# Patient Record
Sex: FEMALE | Race: WHITE | NOT HISPANIC OR LATINO
[De-identification: names, ages, dates, MRNs, and addresses within clinical notes are randomized per-mention and may not be internally consistent; named-entity substitution may affect disease eponyms.]

---

## 2020-11-30 ENCOUNTER — TRANSCRIPTION ENCOUNTER (OUTPATIENT)
Age: 45
End: 2020-11-30

## 2021-12-27 PROBLEM — Z00.00 ENCOUNTER FOR PREVENTIVE HEALTH EXAMINATION: Status: ACTIVE | Noted: 2021-12-27

## 2022-01-03 ENCOUNTER — APPOINTMENT (OUTPATIENT)
Dept: FAMILY MEDICINE | Facility: CLINIC | Age: 47
End: 2022-01-03

## 2022-05-20 ENCOUNTER — APPOINTMENT (OUTPATIENT)
Dept: ORTHOPEDIC SURGERY | Facility: CLINIC | Age: 47
End: 2022-05-20

## 2022-05-26 PROBLEM — Z00.00 ENCOUNTER FOR PREVENTIVE HEALTH EXAMINATION: Noted: 2022-05-26

## 2022-05-31 RX ORDER — TRAMADOL HYDROCHLORIDE 50 MG/1
50 TABLET, COATED ORAL TWICE DAILY
Qty: 60 | Refills: 0 | Status: ACTIVE | COMMUNITY
Start: 2022-05-31 | End: 1900-01-01

## 2022-06-03 ENCOUNTER — APPOINTMENT (OUTPATIENT)
Dept: ORTHOPEDIC SURGERY | Facility: CLINIC | Age: 47
End: 2022-06-03

## 2022-06-10 ENCOUNTER — APPOINTMENT (OUTPATIENT)
Dept: ORTHOPEDIC SURGERY | Facility: CLINIC | Age: 47
End: 2022-06-10
Payer: COMMERCIAL

## 2022-06-10 VITALS — WEIGHT: 214 LBS | BODY MASS INDEX: 33.2 KG/M2 | HEIGHT: 67.5 IN

## 2022-06-10 DIAGNOSIS — Z78.9 OTHER SPECIFIED HEALTH STATUS: ICD-10-CM

## 2022-06-10 DIAGNOSIS — M46.00 SPINAL ENTHESOPATHY, SITE UNSPECIFIED: ICD-10-CM

## 2022-06-10 PROCEDURE — 99213 OFFICE O/P EST LOW 20 MIN: CPT | Mod: 25

## 2022-06-10 PROCEDURE — 20552 NJX 1/MLT TRIGGER POINT 1/2: CPT

## 2022-06-10 PROCEDURE — 72040 X-RAY EXAM NECK SPINE 2-3 VW: CPT

## 2022-06-13 ENCOUNTER — FORM ENCOUNTER (OUTPATIENT)
Age: 47
End: 2022-06-13

## 2022-06-14 ENCOUNTER — APPOINTMENT (OUTPATIENT)
Dept: MRI IMAGING | Facility: CLINIC | Age: 47
End: 2022-06-14
Payer: COMMERCIAL

## 2022-06-14 ENCOUNTER — APPOINTMENT (OUTPATIENT)
Dept: MRI IMAGING | Facility: CLINIC | Age: 47
End: 2022-06-14

## 2022-06-14 PROBLEM — M46.00 SPINAL ENTHESOPATHY: Status: ACTIVE | Noted: 2022-06-14

## 2022-06-14 PROCEDURE — 72156 MRI NECK SPINE W/O & W/DYE: CPT

## 2022-06-14 NOTE — PHYSICAL EXAM
[Normal Coordination] : normal coordination [Normal DTR UE/LE] : normal DTR UE/LE  [Normal Sensation] : normal sensation [Normal Mood and Affect] : normal mood and affect [Orientated] : orientated [Able to Communicate] : able to communicate [Normal Skin] : normal skin [No Rash] : no rash [No Ulcers] : no ulcers [No Lesions] : no lesions [No obvious lymphadenopathy in areas examined] : no obvious lymphadenopathy in areas examined [Well Developed] : well developed [Well Nourished] : well nourished [Peripheral vascular exam is grossly normal] : peripheral vascular exam is grossly normal [No Respiratory Distress] : no respiratory distress [Lungs clear to auscultation bilaterally] : lungs clear to auscultation bilaterally [NL (45)] : right lateral flexion 45 degrees [NL (80)] : right lateral rotation 80 degrees [5___] : right grasp 5[unfilled]/5 [Biceps 2+] : biceps 2+ [Triceps 2+] : triceps 2+ [Brachioradialis 2+] : brachioradialis 2+ [] : no rhomboid tenderness [FreeTextEntry1] : Good maintenance of alignment and implant placement. Good progress towards fusion.

## 2022-06-14 NOTE — DISCUSSION/SUMMARY
[Medication Risks Reviewed] : Medication risks reviewed [de-identified] : Discussed proper body mechanics and modified physical activity to avoid aggravation of symptoms. Patient will continue with at home exercises/stretches as best tolerated. Patient referred for updated cervical spine MRI scan for new and worsening radiating pain into RT arm. Evaluate for new disc herniation vs stenosis given recurrence of right shoulder and proximal arm pain now 5 months post ACDF and refractory to home exercise program and medical management.. Discussed further treatment with injection therapy. Trigger point injection given office. Patient will follow up after MRI scan. Reviewed and discussed results of cervical spine x-ray.\par \par Today in office as part of ongoing treatment a trigger point injection is administered into the right trapezius and paracervical muscles to facilitate ROM and stretching. Injection consisted of 1ccc Kenalog 40 and 4cc .25% Marcaine.

## 2022-06-14 NOTE — HISTORY OF PRESENT ILLNESS
[10] : 10 [Tingling] : tingling [Full time] : Work status: full time [de-identified] : Patient states the severity of her pain has gotten worse since her previous visit. Patient also complains of dizziness. Pain radiates into RT arm with numbness/tingling sensation. Treatment with naproxen and Tramadol as prescribed has provided temporary relief. Pain is worse with increase physical activity. No known trauma. Patient has continued with at home exercises/stretches as best tolerated. [] : no [FreeTextEntry1] : c-spine  [FreeTextEntry6] : numbness  [FreeTextEntry7] : right arm  [de-identified] : None

## 2022-06-14 NOTE — DATA REVIEWED
[Cervical Spine] : cervical spine [I independently reviewed and interpreted images and report] : I independently reviewed and interpreted images and report [I reviewed the films/CD and additionally noted] : I reviewed the films/CD and additionally noted [FreeTextEntry1] : I stop paperwork reviewed

## 2022-06-18 ENCOUNTER — APPOINTMENT (OUTPATIENT)
Dept: ORTHOPEDIC SURGERY | Facility: CLINIC | Age: 47
End: 2022-06-18
Payer: COMMERCIAL

## 2022-06-18 VITALS — HEIGHT: 67.5 IN | BODY MASS INDEX: 32.89 KG/M2 | WEIGHT: 212 LBS

## 2022-06-18 PROCEDURE — 99214 OFFICE O/P EST MOD 30 MIN: CPT

## 2022-06-19 NOTE — PHYSICAL EXAM
[Flexion] : flexion [Extension] : extension [Rotation to left] : rotation to left [] : light touch intact throughout both upper extremities [de-identified] : Constitutional:\par -  General Appearance: \par Unremarkable\par Body Habitus\par Well Developed \par Well Nourished\par Body Habitus\par No Deformities\par Well Groomed\par \par Ability To communicate:\par Normal\par \par Neurologic: \par Global sensation is intact to upper and lower extremities.  See examination of Neck and/or Spine for exceptions.\par Orientation to Time, Place and Person is: Normal\par Mood And Affect is Normal\par \par Skin:\par -  Head/Face, Right Upper/Lower Extremity, Left Upper/Lower Extremity: Normal\par See Examination of Neck and/or Spine for exceptions\par \par Cardiovascular:\par Peripheral Cardiovascular System is Normal\par \par Palpation of Lymph Nodes:\par Normal Palpation of lymph nodes in: Axilla, Cervical, Inguinal\par Abnormal Palpation of lymph nodes in: None

## 2022-06-19 NOTE — DATA REVIEWED
[MRI] : MRI [Cervical Spine] : cervical spine [I independently reviewed and interpreted images and report] : I independently reviewed and interpreted images and report [FreeTextEntry1] : I stop paperwork reviewed

## 2022-06-19 NOTE — REVIEW OF SYSTEMS
[Arthralgia] : arthralgia [Joint Pain] : joint pain [Joint Stiffness] : joint stiffness [Negative] : Heme/Lymph [FreeTextEntry9] : cervical spine

## 2022-06-19 NOTE — ASSESSMENT
[FreeTextEntry1] : 46 y/o female with cervical stenosis. I am referring the patient to pain management to discuss trying further injection based therapies, cervical epidural injection. I am referring the patient to physiatry to discuss other types of non-invasive treatments, acupuncture. I would like to follow up with the patient in 3-4 weeks to assess his response to conservative care. Discussed possibility of acdf C6-7 if positive response to epidural.\par \par Prior to appointment and during encounter with patient extensive medical records were reviewed including but not limited to, hospital records, out patient records, imaging results, and lab data. During this appointment the patient was examined, diagnoses were discussed and explained in a face to face manner. In addition extensive time was spent reviewing aforementioned diagnostic studies. Counseling including abnormal image results, differential diagnoses, treatment options, risk and benefits, lifestyle changes, current condition, and current medications was performed. Patient's comments, questions, and concerns were address and patient verbalized understanding. Based on this patient's presentation at our office, which is an orthopedic spine surgeon's office, this patient inherently / intrinsically has a risk, however minute, of developing issues such as Cauda equina syndrome, bowel and bladder changes, or progression of motor or neurological deficits such as paralysis which may be permanent.\par \par The documentation recorded by the scribe, in my presence, accurately reflects the service that I personally performed and the decisions made by me with my edits as appropriate.

## 2022-06-19 NOTE — HISTORY OF PRESENT ILLNESS
[10] : 10 [Radiating] : radiating [] : yes [Constant] : constant [Nothing helps with pain getting better] : Nothing helps with pain getting better [de-identified] : 46 y/o female presents for a follow up evaluation and review of her cervical spine MRI. Patient c/o continued neck and right upper extremity pain. Pain predominantly in right trapezius, shoulder and periscapular area. No improvement with nsaid, muscle relaxer or trigger point injection. [FreeTextEntry7] : right side [de-identified] : MRI [facility]

## 2022-06-27 ENCOUNTER — APPOINTMENT (OUTPATIENT)
Dept: PAIN MANAGEMENT | Facility: CLINIC | Age: 47
End: 2022-06-27
Payer: COMMERCIAL

## 2022-06-27 VITALS — WEIGHT: 213 LBS | HEIGHT: 67 IN | BODY MASS INDEX: 33.43 KG/M2

## 2022-06-27 PROCEDURE — 99204 OFFICE O/P NEW MOD 45 MIN: CPT

## 2022-06-27 NOTE — HISTORY OF PRESENT ILLNESS
[Neck] : neck [Right Arm] : right arm [Sudden] : sudden [10] : 10 [9] : 9 [Localized] : localized [Tingling] : tingling [Intermittent] : intermittent [Household chores] : household chores [Leisure] : leisure [Social interactions] : social interactions [Meds] : meds [Exercising] : exercising [Full time] : Work status: full time [FreeTextEntry1] : The patient presents for initial evaluation regarding their neck pain. Patient was referred by Dr. Higgins. Patient is 6 months s/p C4-5 ACDF with Dr. Higgins. She reports some improvement of pain following the sx. Patient reports this pain is different from the prior. Patient c/o neck pain radiating to the right shoulder with associated paraesthesia to the right digits. She reports pain has become more persistent. 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 Patient reports some benefit with Naproxen. Patient reports having IM cortisone injections with no observable benefit. \par \par Subjective Weakness: Yes\par Numbness/Tingling: Yes\par Bladder/Bowel dysfunction: No \par Gait Abnormalities: No \par Fine motor coordination changes: No \par \par Injections: No  \par \par Pertinent Surgical History: \par 1) C4-5 ACDF (01/10/22) - Dr. Higgins\par \par Imaging: \par MRI Cervical Spine (06/14/22) - [facility]\par \par C2-C3: There is no posterior disc herniation.\par C3-C4: There is mild disc bulging, bony ridging and right greater than left foraminal narrowing without cord or \par exiting nerve root impingement.\par C4-C5: There are new postoperative changes with residual bony ridging contributing to mild central stenosis on \par the left and there is left greater than right foraminal narrowing improved since prior exam.\par C5-C6: There is disc bulging, bony ridging, uncovertebral hypertrophy and right greater than left foraminal \par narrowing.\par C6-C7: There is disc bulging, bony ridging, uncovertebral hypertrophy and right greater than left foraminal \par narrowing with right exiting nerve root impingement with right foraminal herniation. \par \par Physician Disclaimer: I have personally reviewed and confirmed all HPI data with the patient. [] : no [FreeTextEntry7] : right arm and right fingers [de-identified] : cervical mri at [facility] and [facility]

## 2022-06-27 NOTE — ASSESSMENT
[FreeTextEntry1] : A discussion regarding available pain management treatment options occurred with the patient.  These included interventional, rehabilitative, pharmacological, and alternative modalities. We will proceed with the following: \par \par Interventional treatment options: \par - Proceed with right PM C7-T1 ALVARO with fluoroscopic guidance \par - see additional instructions below \par \par Rehabilitative options: \par - completed prior PT \par - continue HEP as tolerated \par \par Medication based treatment options: \par - continue Lyrica 75 mg BID; further titration based upon clinical response\par - continue naproxen 500mg BID PRN \par - see additional instructions below \par \par Complementary treatment options: \par - Weight management and lifestyle modifications discussed \par \par Additional treatment recommendations as follows: \par - patient with follow up with Dr. Higgins as directed \par - Follow up 1-2 weeks post injection for assessment of efficacy and further recommendations.\par \par We have discussed the risks, benefits, and alternatives NSAID therapy including but not limited to the risk of bleeding, thrombosis, gastric mucosal irritation/ulceration, allergic reaction and kidney dysfunction; the patient verbalizes an understanding.\par \par The risks, benefits and alternatives of the proposed procedure were explained in detail with the patient. The risks outlined include but are not limited to infection, bleeding, post- dural puncture headache, nerve injury, a temporary increase in pain, failure to resolve symptoms, allergic reaction, and possible elevation of blood sugar in diabetics if using corticosteroid.  All questions were answered to patient's apparent satisfaction and he/she verbalized an understanding.\par \par The documentation recorded by the scribe, in my presence, accurately reflects the service I personally performed and the decisions made by me with my edits as appropriate.\par \par I, Kumar Bray acting as scribe, attest that this documentation has been prepared under the direction and in the presence of Provider Srinivas Fontaine DO.

## 2022-06-27 NOTE — PHYSICAL EXAM
[Normal Coordination] : normal coordination [Normal Mood and Affect] : normal mood and affect [Orientated] : orientated [Able to Communicate] : able to communicate [Well Developed] : well developed [Well Nourished] : well nourished [5___] : right finger abductors 5[unfilled]/5 [4___] : right grasp 4[unfilled]/5 [] : negative Chase reflex

## 2022-07-08 ENCOUNTER — APPOINTMENT (OUTPATIENT)
Dept: PAIN MANAGEMENT | Facility: CLINIC | Age: 47
End: 2022-07-08

## 2022-07-08 PROCEDURE — 62321 NJX INTERLAMINAR CRV/THRC: CPT

## 2022-07-08 NOTE — PROCEDURE
[FreeTextEntry3] : Date of Service: 07/08/2022 \par \par Account: [id number]\par \par Patient: Agustin Bustamante \par \par YOB: 1975\par \par Age: 47 year\par \par Surgeon:      Srinivas Fontaine DO\par \par Assistant:    None\par \par Pre-Operative Diagnosis:         Cervical Radiculopathy (M54.12)\par \par Post Operative Diagnosis:       Cervical Radiculopathy (M54.12)\par \par Procedure:             Right paramedian (C7-T1) interlaminar epidural steroid injection under fluoroscopic guidance\par \par Anesthesia:  MAC\par \par This procedure was carried out using fluoroscopic guidance.  The risks and benefits of the procedure were discussed extensively with the patient.  The consent of the patient was obtained and the following procedure was performed.   A timeout was performed with all essential staff present and the site and side were verified.\par \par The patient was placed in the prone position and optimized to patient comfort.  The cervical area was prepped and draped in a sterile fashion.  The fluoroscope visualized the C7-T1 interspace using slight cephalad-caudad angulation and this area was marked.  Using sterile technique the superficial skin was anesthetized with 1% Lidocaine.  A 20 gauge 3.5 inch Tuohy needle was advanced under fluoroscopy until ligament was engaged.  Using a contralateral oblique view, a "loss of resistance" to air technique was utilized in order to gain access to the epidural space.  After negative aspiration for heme and CSF, 1 cc of Omnipaque contrast was administered and the appropriate cervical epidurogram was obtained in the [place] and A/P view as well as digital subtraction angiography.\par \par A total injectate of 3 cc of preservative free normal saline and 10 mg of Dexamethasone was then injected into the epidural space while maintaining meaningful verbal contact with the patient.  \par \par The needle was subsequently removed.  Vital signs remained normal.  Pulse oximeter was used throughout the procedure and the patient's pulse and oxygen saturation remained within normal limits.  The patient tolerated the procedure well.  There were no complications.  The patient was instructed to apply ice over the injection sites for twenty minutes every two hours for the next 24 to 48 hours.\par \par Disposition:\par      1. The patient was advised to F/U in 1-2 weeks to assess the response to the injection.\par      2. The patient was also instructed to contact me immediately if there were any concerns related to the procedure performed.

## 2022-07-12 ENCOUNTER — APPOINTMENT (OUTPATIENT)
Dept: PAIN MANAGEMENT | Facility: CLINIC | Age: 47
End: 2022-07-12

## 2022-07-25 ENCOUNTER — APPOINTMENT (OUTPATIENT)
Dept: PAIN MANAGEMENT | Facility: CLINIC | Age: 47
End: 2022-07-25

## 2022-07-25 VITALS — WEIGHT: 213 LBS | HEIGHT: 67 IN | BODY MASS INDEX: 33.43 KG/M2

## 2022-07-25 DIAGNOSIS — Z98.1 ARTHRODESIS STATUS: ICD-10-CM

## 2022-07-25 PROCEDURE — 99214 OFFICE O/P EST MOD 30 MIN: CPT

## 2022-07-25 RX ORDER — NAPROXEN 500 MG/1
500 TABLET ORAL TWICE DAILY
Qty: 60 | Refills: 2 | Status: ACTIVE | COMMUNITY
Start: 2022-07-25 | End: 1900-01-01

## 2022-07-25 NOTE — ASSESSMENT
[FreeTextEntry1] : A discussion regarding available pain management treatment options occurred with the patient.  These included interventional, rehabilitative, pharmacological, and alternative modalities. We will proceed with the following: \par \par Interventional treatment options: \par - Proceed with repeat right PM C7-T1 ALVARO with fluoroscopic guidance (80mg Kenalog) \par - counseled if no further benefit with repeat ALVARO will not recommend further\par - can consider cervical TPI for ongoing myofascial pain \par - see additional instructions below \par \par Rehabilitative options: \par - completed prior PT \par - continue HEP as tolerated \par \par Medication based treatment options: \par - patient self d/c Lyrica; defers further consideration \par - continue naproxen 500 mg BID PRN \par - continue OTC topical analgesics PRN \par - see additional instructions below \par \par Complementary treatment options: \par - Weight management and lifestyle modifications discussed \par \par Additional treatment recommendations as follows: \par - patient with follow up with Dr. Higgins as directed \par - Follow up 1-2 weeks post injection for assessment of efficacy and further recommendations.\par \par We have discussed the risks, benefits, and alternatives NSAID therapy including but not limited to the risk of bleeding, thrombosis, gastric mucosal irritation/ulceration, allergic reaction and kidney dysfunction; the patient verbalizes an understanding.\par \par The risks, benefits and alternatives of the proposed procedure were explained in detail with the patient. The risks outlined include but are not limited to infection, bleeding, post- dural puncture headache, nerve injury, a temporary increase in pain, failure to resolve symptoms, allergic reaction, and possible elevation of blood sugar in diabetics if using corticosteroid.  All questions were answered to patient's apparent satisfaction and he/she verbalized an understanding.\par \par The documentation recorded by the scribe, in my presence, accurately reflects the service I personally performed and the decisions made by me with my edits as appropriate.\par \par I, Kumar Bray acting as scribe, attest that this documentation has been prepared under the direction and in the presence of Provider Srinivas Fontaine DO.

## 2022-07-25 NOTE — HISTORY OF PRESENT ILLNESS
[Neck] : neck [Right Arm] : right arm [Sudden] : sudden [Localized] : localized [Tingling] : tingling [Intermittent] : intermittent [Household chores] : household chores [Leisure] : leisure [Social interactions] : social interactions [Meds] : meds [Exercising] : exercising [Full time] : Work status: full time [6] : 6 [4] : 4 [FreeTextEntry1] : 07/25/22 - Patient presents for a FUV following an right PM C7-T1 ALVARO on 07/08/22. Patient reports no short term relief following the injection. She reports stiffness and pain to the right trap and paraesthesia to the right second, third and fourth digit. Denies side effects to the injection. \par \par 06/27/22 - The patient presents for initial evaluation regarding their neck pain. Patient was referred by Dr. Higgins. Patient is 6 months s/p C4-5 ACDF with Dr. Higgins. She reports some improvement of pain following the sx. Patient reports this pain is different from the prior. Patient c/o neck pain radiating to the right shoulder with associated paraesthesia to the right digits. She reports pain has become more persistent. 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 Patient reports some benefit with Naproxen. Patient reports having IM cortisone injections with no observable benefit. \par \par Injections: \par 1) C7-T1 ALVARO (07/08/22) \par \par Pertinent Surgical History: \par 1) C4-5 ACDF (01/10/22) - Dr. Higgins\par \par Imaging: \par MRI Cervical Spine (06/14/22) - [facility]\par \par C2-C3: There is no posterior disc herniation.\par C3-C4: There is mild disc bulging, bony ridging and right greater than left foraminal narrowing without cord or \par exiting nerve root impingement.\par C4-C5: There are new postoperative changes with residual bony ridging contributing to mild central stenosis on \par the left and there is left greater than right foraminal narrowing improved since prior exam.\par C5-C6: There is disc bulging, bony ridging, uncovertebral hypertrophy and right greater than left foraminal \par narrowing.\par C6-C7: There is disc bulging, bony ridging, uncovertebral hypertrophy and right greater than left foraminal \par narrowing with right exiting nerve root impingement with right foraminal herniation. \par \par Physician Disclaimer: I have personally reviewed and confirmed all HPI data with the patient. [] : Post Surgical Visit: no [FreeTextEntry7] : right arm and right fingers [de-identified] : cervical mri at [facility] and [facility]

## 2022-07-30 ENCOUNTER — APPOINTMENT (OUTPATIENT)
Dept: PAIN MANAGEMENT | Facility: CLINIC | Age: 47
End: 2022-07-30

## 2022-08-01 ENCOUNTER — APPOINTMENT (OUTPATIENT)
Dept: ORTHOPEDIC SURGERY | Facility: CLINIC | Age: 47
End: 2022-08-01

## 2022-08-01 VITALS — WEIGHT: 213 LBS | HEIGHT: 67 IN | BODY MASS INDEX: 33.43 KG/M2

## 2022-08-01 PROCEDURE — 99214 OFFICE O/P EST MOD 30 MIN: CPT

## 2022-08-14 NOTE — PHYSICAL EXAM
[Flexion] : flexion [Extension] : extension [Rotation to left] : rotation to left [] : light touch intact throughout both upper extremities [de-identified] : Constitutional:\par -  General Appearance: \par Unremarkable\par Body Habitus\par Well Developed \par Well Nourished\par Body Habitus\par No Deformities\par Well Groomed\par \par Ability To communicate:\par Normal\par \par Neurologic: \par Global sensation is intact to upper and lower extremities.  See examination of Neck and/or Spine for exceptions.\par Orientation to Time, Place and Person is: Normal\par Mood And Affect is Normal\par \par Skin:\par -  Head/Face, Right Upper/Lower Extremity, Left Upper/Lower Extremity: Normal\par See Examination of Neck and/or Spine for exceptions\par \par Cardiovascular:\par Peripheral Cardiovascular System is Normal\par \par Palpation of Lymph Nodes:\par Normal Palpation of lymph nodes in: Axilla, Cervical, Inguinal\par Abnormal Palpation of lymph nodes in: None

## 2022-08-14 NOTE — HISTORY OF PRESENT ILLNESS
[10] : 10 [7] : 7 [Full time] : Work status: full time [de-identified] : 48 y/o female presents for a follow up evaluation of cervical. Patient c/o continued neck and right upper extremity pain. Pain predominantly in right trapezius, shoulder and periscapular area. No improvement with nsaid, muscle relaxer or trigger point injection. Patient reports no relief of symptoms from injection therapy  (at C5-6) with Dr. Fontaine. No relief from TPI. No positive response from injections.  Patient is RT handed. Finds her pain has affecting quality of life. Reports side effects from gabapentin, which she has stopped taking. Patient has had no success from tramadol or Lyrica.  [de-identified] : pain mgmt- injection

## 2022-08-14 NOTE — ASSESSMENT
[FreeTextEntry1] : 48 y/o female with cervical stenosis. Patient will continue treatment with Naproxen. Patient referred to Dr. Chapman for acupuncture. I advised the patient to receive second epidural injection with Dr. Fontaine. Discussed possibility of acdf C6-7 if positive response to epidural. Will follow up with patient in 4 weeks. \par \par Prior to appointment and during encounter with patient extensive medical records were reviewed including but not limited to, hospital records, out patient records, imaging results, and lab data. During this appointment the patient was examined, diagnoses were discussed and explained in a face to face manner. In addition extensive time was spent reviewing aforementioned diagnostic studies. Counseling including abnormal image results, differential diagnoses, treatment options, risk and benefits, lifestyle changes, current condition, and current medications was performed. Patient's comments, questions, and concerns were address and patient verbalized understanding. Based on this patient's presentation at our office, which is an orthopedic spine surgeon's office, this patient inherently / intrinsically has a risk, however minute, of developing issues such as Cauda equina syndrome, bowel and bladder changes, or progression of motor or neurological deficits such as paralysis which may be permanent.\par \par The documentation recorded by the scribe, in my presence, accurately reflects the service that I personally performed and the decisions made by me with my edits as appropriate.

## 2022-08-17 ENCOUNTER — APPOINTMENT (OUTPATIENT)
Dept: PHYSICAL MEDICINE AND REHAB | Facility: CLINIC | Age: 47
End: 2022-08-17

## 2022-08-17 DIAGNOSIS — Z86.39 PERSONAL HISTORY OF OTHER ENDOCRINE, NUTRITIONAL AND METABOLIC DISEASE: ICD-10-CM

## 2022-08-17 DIAGNOSIS — Z98.1 ARTHRODESIS STATUS: ICD-10-CM

## 2022-08-17 DIAGNOSIS — Z82.3 FAMILY HISTORY OF STROKE: ICD-10-CM

## 2022-08-17 PROCEDURE — 99205 OFFICE O/P NEW HI 60 MIN: CPT

## 2022-08-17 RX ORDER — LEVOTHYROXINE SODIUM 0.17 MG/1
TABLET ORAL
Refills: 0 | Status: ACTIVE | COMMUNITY

## 2022-08-17 RX ORDER — METAXALONE 800 MG/1
800 TABLET ORAL 3 TIMES DAILY
Qty: 90 | Refills: 0 | Status: ACTIVE | COMMUNITY
Start: 2022-08-17 | End: 1900-01-01

## 2022-08-17 RX ORDER — MELOXICAM 15 MG/1
15 TABLET ORAL DAILY
Qty: 30 | Refills: 0 | Status: ACTIVE | COMMUNITY
Start: 2022-08-17 | End: 1900-01-01

## 2022-09-09 ENCOUNTER — APPOINTMENT (OUTPATIENT)
Dept: ORTHOPEDIC SURGERY | Facility: CLINIC | Age: 47
End: 2022-09-09

## 2022-09-14 ENCOUNTER — APPOINTMENT (OUTPATIENT)
Dept: ORTHOPEDIC SURGERY | Facility: CLINIC | Age: 47
End: 2022-09-14

## 2022-09-16 ENCOUNTER — APPOINTMENT (OUTPATIENT)
Dept: PHYSICAL MEDICINE AND REHAB | Facility: CLINIC | Age: 47
End: 2022-09-16

## 2022-09-30 ENCOUNTER — APPOINTMENT (OUTPATIENT)
Dept: PHYSICAL MEDICINE AND REHAB | Facility: CLINIC | Age: 47
End: 2022-09-30

## 2022-10-28 ENCOUNTER — APPOINTMENT (OUTPATIENT)
Dept: PHYSICAL MEDICINE AND REHAB | Facility: CLINIC | Age: 47
End: 2022-10-28

## 2022-11-04 ENCOUNTER — APPOINTMENT (OUTPATIENT)
Dept: PHYSICAL MEDICINE AND REHAB | Facility: CLINIC | Age: 47
End: 2022-11-04

## 2022-11-15 ENCOUNTER — APPOINTMENT (OUTPATIENT)
Dept: PHYSICAL MEDICINE AND REHAB | Facility: CLINIC | Age: 47
End: 2022-11-15

## 2022-11-22 ENCOUNTER — APPOINTMENT (OUTPATIENT)
Dept: PHYSICAL MEDICINE AND REHAB | Facility: CLINIC | Age: 47
End: 2022-11-22

## 2022-11-29 ENCOUNTER — APPOINTMENT (OUTPATIENT)
Dept: PHYSICAL MEDICINE AND REHAB | Facility: CLINIC | Age: 47
End: 2022-11-29

## 2022-12-06 ENCOUNTER — APPOINTMENT (OUTPATIENT)
Dept: PHYSICAL MEDICINE AND REHAB | Facility: CLINIC | Age: 47
End: 2022-12-06

## 2023-01-13 ENCOUNTER — APPOINTMENT (OUTPATIENT)
Dept: PHYSICAL MEDICINE AND REHAB | Facility: CLINIC | Age: 48
End: 2023-01-13

## 2023-01-20 ENCOUNTER — APPOINTMENT (OUTPATIENT)
Dept: PHYSICAL MEDICINE AND REHAB | Facility: CLINIC | Age: 48
End: 2023-01-20
Payer: COMMERCIAL

## 2023-01-20 DIAGNOSIS — M50.20 OTHER CERVICAL DISC DISPLACEMENT, UNSPECIFIED CERVICAL REGION: ICD-10-CM

## 2023-01-20 DIAGNOSIS — M50.90 CERVICAL DISC DISORDER, UNSPECIFIED, UNSPECIFIED CERVICAL REGION: ICD-10-CM

## 2023-01-20 PROCEDURE — 97813 ACUP 1/> W/ESTIM 1ST 15 MIN: CPT

## 2023-01-20 PROCEDURE — 97814 ACUP 1/> W/ESTIM EA ADDL 15: CPT | Mod: 59

## 2023-01-20 NOTE — PROCEDURE
[de-identified] : Acupuncture treatment:\par baldry technique with multiple needle placement to the cervical paraspinal and parascapular musculature with UV lamp x45 minutes\par Paracervical chain (bladder meridian) with ES x45 minutes\par Pens treatment cervical spine with ES x45 minutes\par BL 10, SP 6, ST 36, LR 3, LI 4, BL 60, GB 21\par Patient tolerated procedure well

## 2023-01-26 ENCOUNTER — APPOINTMENT (OUTPATIENT)
Dept: PHYSICAL MEDICINE AND REHAB | Facility: CLINIC | Age: 48
End: 2023-01-26
Payer: COMMERCIAL

## 2023-01-26 DIAGNOSIS — M54.12 RADICULOPATHY, CERVICAL REGION: ICD-10-CM

## 2023-01-26 DIAGNOSIS — M79.18 MYALGIA, OTHER SITE: ICD-10-CM

## 2023-01-26 DIAGNOSIS — M47.812 SPONDYLOSIS W/OUT MYELOPATHY OR RADICULOPATHY, CERVICAL REGION: ICD-10-CM

## 2023-01-26 DIAGNOSIS — M62.838 OTHER MUSCLE SPASM: ICD-10-CM

## 2023-01-26 PROCEDURE — 97813 ACUP 1/> W/ESTIM 1ST 15 MIN: CPT | Mod: 59

## 2023-01-26 NOTE — PROCEDURE
[de-identified] : Acupuncture treatment:\par baldry technique with multiple needle placement to the cervical paraspinal and parascapular musculature with UV lamp x45 minutes\par Paracervical chain (bladder meridian) with ES x45 minutes\par K3-HT3\par SI 4-BL 60 with ES x45 minutes\par BL 10, SP 6, ST 36, LR 3, LI 4, BL 59, GB 21\par Patient tolerated procedure well

## 2023-02-03 ENCOUNTER — APPOINTMENT (OUTPATIENT)
Dept: PHYSICAL MEDICINE AND REHAB | Facility: CLINIC | Age: 48
End: 2023-02-03

## 2023-02-10 ENCOUNTER — APPOINTMENT (OUTPATIENT)
Dept: PHYSICAL MEDICINE AND REHAB | Facility: CLINIC | Age: 48
End: 2023-02-10

## 2023-02-14 ENCOUNTER — APPOINTMENT (OUTPATIENT)
Dept: PHYSICAL MEDICINE AND REHAB | Facility: CLINIC | Age: 48
End: 2023-02-14

## 2023-02-14 NOTE — CONSULT LETTER
[Dear  ___] : Dear  [unfilled], [Consult Letter:] : I had the pleasure of evaluating your patient, [unfilled]. [( Thank you for referring [unfilled] for consultation for _____ )] : Thank you for referring [unfilled] for consultation for [unfilled] [Please see my note below.] : Please see my note below. [Consult Closing:] : Thank you very much for allowing me to participate in the care of this patient.  If you have any questions, please do not hesitate to contact me. [Sincerely,] : Sincerely, [FreeTextEntry3] : Song Flowers MD

## 2023-02-14 NOTE — HISTORY OF PRESENT ILLNESS
[FreeTextEntry1] : Pt is a 47 year old female with a history of chronic neck pain. Approximately 1 years ago, pt reports an exacerbation of cervical spine pain with radicular symptoms involving her right U/E. Pt states she was evaluated by Dr. Higgins (spinal surgeon) and underwent a cervical spinal fusion in January 2022 with post-operative PT. Ms. Bustamante reports improvement of radicular symptoms post surgery. However, she continues to experience right sided neck pain with intermittent pain and paraesthesia involving her right U/E. She was referred to Dr. Fontaine (PM) where a cervical CHLOE was administered which provided temporary relief. Pt reports her symptoms are aggravated with repetitive over shoulder motions involving her right U/E. Pt is currently taking Naproxen and Cyclobenzaprine PRN. \par Pt was referred to my office today for evaluation of acupuncture for her C/S complaints.

## 2023-02-14 NOTE — PHYSICAL EXAM
[Normal] : Normal skin color and pigmentation, normal turgor and no rash [Normal Female] : normal external genitalia, urethral meatus without lesions or discharge. Bladder non-distended, without tenderness. Vagina and cervix normal on visual inspection. On bimanual exam uterus, adnexa, parametria all normal without any discrete masses. [FreeTextEntry1] : EXAMINATION OF THE CERVICAL SPINE AND UPPER EXTREMITIES: \par \par Upon inspection, anterior surgical site noted.\par \par Cranial nerve testing was intact.  Smell and taste were not tested. \par Visual fields were full.  \par There was no difficulty with finger to nose response.  Pt is able to preform rapid alternating movements of digits of the hands bilaterally. \par Romberg testing was negative.  \par There was no dysmetria of the upper extremities. \par Reflexes revealed 2 and symmetrical throughout. \par No gross atrophy \par MMT U/E: intact \par Sensory examination revealed decreased sensation right C5 and C6 dermatome. \par Vibratory and proprioceptive testing were intact.  \par Peripheral pulses were palpable bilaterally.  Chase Test was negative bilaterally.  Tinels Test was negative at the wrists bilaterally.  \par The Spurling Cervical Compression Test was positive.  The Adson’s Maneuver was negative bilaterally.  No scapular winging was noted.  There was good scapular mobility.  \par \par Range of motion testing was performed with the use of a goniometer.  \par On range of motion testing, \par Flexion was to 40º (normal - 45º)\par Extension was to 15º (normal - 55º)\par Right rotation was to 54º (normal - 70º)\par Left rotation was to 60º (normal - 70º)\par Right lateral bending was to 30º (normal - 40º)\par Left lateral bending was to 22º (normal - 40º)\par \par On palpation, of the cervical spine there was tenderness and spasm involving the right cervical paraspinal musculature. Tenderness involving C4/C5 and C5/C6 spinous processes. Trigger points bilateral trapezii with greater involvement on the right. Tenderness and spasm right levator scapulae with greater involvement on the right. \par \par \par  [de-identified] : see exam  [de-identified] : see exam  [de-identified] : see exam

## 2023-02-14 NOTE — ASSESSMENT
[FreeTextEntry1] : Initiate acupuncture to C/S 1x weekly/x8 weeks. \par Mobic 15mg po qd #30 with meal \par Skelaxin 800mg TID #90\par D/C Naproxen\par HEP reviewed with pt.\par Recheck in 4-6 weeks.

## 2023-02-17 ENCOUNTER — APPOINTMENT (OUTPATIENT)
Dept: PHYSICAL MEDICINE AND REHAB | Facility: CLINIC | Age: 48
End: 2023-02-17

## 2024-05-15 ENCOUNTER — APPOINTMENT (OUTPATIENT)
Dept: ORTHOPEDIC SURGERY | Facility: CLINIC | Age: 49
End: 2024-05-15
Payer: COMMERCIAL

## 2024-05-15 DIAGNOSIS — M54.12 RADICULOPATHY, CERVICAL REGION: ICD-10-CM

## 2024-05-15 DIAGNOSIS — M75.81 OTHER SHOULDER LESIONS, RIGHT SHOULDER: ICD-10-CM

## 2024-05-15 PROCEDURE — ZZZZZ: CPT

## 2024-05-16 ENCOUNTER — RESULT REVIEW (OUTPATIENT)
Age: 49
End: 2024-05-16

## 2024-05-16 NOTE — HISTORY OF PRESENT ILLNESS
[de-identified] : 05/15/2024 - The patient is here for an evaluation due to neck pain, proximal right arm pain, and radicular pain in the right arm. She notes that shoulder and arm pain worsen with the range of motion of their arm, particularly with internal rotation. Massage therapy offers temporary relief. Previously, they underwent trigger point injections with Dr. Chapman but discontinued as there was no improvement in pain. The patient is currently taking naproxen and does not report any left-sided arm symptoms. She states cervical surgery was helpful to improve pre op arm pain, but now there is a return of arm pain as of one year ago.   Date of Injury/Onset: 1 year ago  Pain:  At Rest: 10/10 With Activity:  /10 Mechanism of injury: NKI  Quality of symptoms: Sharp pains, numbness and tingling that goes down into RT arm, nauseous  Improves with: Naproxen, PM Tylenol   Worse with: Sleeping, Prior treatment: PCP Naproxen, Acupuncture  Prior Imaging: None  Additional Information: Pt drops items due to symptoms    08/01/2022 - 46 y/o female presents for a follow up evaluation of cervical. Patient c/o continued neck and right upper extremity pain. Pain predominantly in right trapezius, shoulder and periscapular area. No improvement with nsaid, muscle relaxer or trigger point injection. Patient reports no relief of symptoms from injection therapy  (at C5-6) with Dr. Fontaine. No relief from TPI. No positive response from injections.  Patient is RT handed. Finds her pain has affecting quality of life. Reports side effects from gabapentin, which she has stopped taking. Patient has had no success from tramadol or Lyrica.

## 2024-05-16 NOTE — DATA REVIEWED
[I independently reviewed and interpreted images and report] : I independently reviewed and interpreted images and report [FreeTextEntry1] : I stop paperwork reviewed Physiatry progress notes reviewed Old op report reviewed

## 2024-05-16 NOTE — PHYSICAL EXAM
[Flexion] : flexion [Extension] : extension [Rotation to left] : rotation to left [Right] : right shoulder [] : pain with internal rotation [Normal Coordination] : normal coordination [Normal DTR UE/LE] : normal DTR UE/LE  [Normal Sensation] : normal sensation [Normal Mood and Affect] : normal mood and affect [Oriented] : oriented [Able to Communicate] : able to communicate [Normal Skin] : normal skin [No Rash] : no rash [No Ulcers] : no ulcers [No Lesions] : no lesions [No obvious lymphadenopathy in areas examined] : no obvious lymphadenopathy in areas examined [Well Developed] : well developed [Peripheral vascular exam is grossly normal] : peripheral vascular exam is grossly normal [No Respiratory Distress] : no respiratory distress [de-identified] : Constitutional:\par  -  General Appearance: \par  Unremarkable\par  Body Habitus\par  Well Developed \par  Well Nourished\par  Body Habitus\par  No Deformities\par  Well Groomed\par  \par  Ability To communicate:\par  Normal\par  \par  Neurologic: \par  Global sensation is intact to upper and lower extremities.  See examination of Neck and/or Spine for exceptions.\par  Orientation to Time, Place and Person is: Normal\par  Mood And Affect is Normal\par  \par  Skin:\par  -  Head/Face, Right Upper/Lower Extremity, Left Upper/Lower Extremity: Normal\par  See Examination of Neck and/or Spine for exceptions\par  \par  Cardiovascular:\par  Peripheral Cardiovascular System is Normal\par  \par  Palpation of Lymph Nodes:\par  Normal Palpation of lymph nodes in: Axilla, Cervical, Inguinal\par  Abnormal Palpation of lymph nodes in: None

## 2024-05-16 NOTE — DISCUSSION/SUMMARY
[de-identified] : 48 y/o F with ongoing right arm pain, h/o ACDF C4/5 An MRI of the cervical spine has been requested to evaluate for disc herniation versus stenosis. The patient is experiencing progressive arm pain, numbness, and tingling despite using NSAIDs, Tylenol, home stretching, and exercises, she has a history of cervical fusion. a large joint right shoulder subacromial injection injection was performed in office today for therapeutic and diagnostic purposes. Injection consisted of 1cc Kenalog 40 and 4cc .25% Marcaine. Based on the clinical examination, it was discussed there may also be rotator cuff pathology contributing to the symptoms. The patient will follow up after the cervical MRI and depending upon radiographic findings as well as clinical response to shoulder injection will determine next steps. Discussed possible cervical epidural injection. Will consider potential pseudo in differential, will consider cervical CT. Cumulative encounter duration exceeded 30 minutes.  Prior to appointment and during encounter with patient extensive medical records were reviewed including but not limited to, hospital records, outpatient records, imaging results, and lab data. During this appointment the patient was examined, diagnoses were discussed and explained in a face to face manner. In addition extensive time was spent reviewing aforementioned diagnostic studies. Counseling including abnormal image results, differential diagnoses, treatment options, risk and benefits, lifestyle changes, current condition, and current medications was performed. Patient's comments, questions, and concerns were addressed and patient verbalized understanding. Based on this patient's presentation at our office, which is an orthopedic spine surgeon's office, this patient inherently / intrinsically has a risk, however minute, of developing issues such as Cauda equina syndrome, bowel and bladder changes, or progression of motor or neurological deficits such as paralysis which may be permanent.  VARSHA BOUDREAUX Acting as a Scribe for Varsha Puckett, attest that this documentation has been prepared under the direction and in the presence of Provider Blade Higgins MD.

## 2024-05-29 ENCOUNTER — APPOINTMENT (OUTPATIENT)
Dept: ORTHOPEDIC SURGERY | Facility: CLINIC | Age: 49
End: 2024-05-29
Payer: COMMERCIAL

## 2024-05-29 VITALS — BODY MASS INDEX: 33.43 KG/M2 | HEIGHT: 67 IN | WEIGHT: 213 LBS

## 2024-05-29 DIAGNOSIS — M50.10 CERVICAL DISC DISORDER WITH RADICULOPATHY, UNSPECIFIED CERVICAL REGION: ICD-10-CM

## 2024-05-29 PROCEDURE — 99213 OFFICE O/P EST LOW 20 MIN: CPT

## 2024-05-29 NOTE — DATA REVIEWED
[I independently reviewed and interpreted images and report] : I independently reviewed and interpreted images and report [FreeTextEntry2] : MRI cervical spine [facility] - Postoperative changes noted. Multilevel cervical degeneration noted. 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  [FreeTextEntry1] : I stop paperwork reviewed Physiatry progress notes reviewed Old op report reviewed

## 2024-05-29 NOTE — PHYSICAL EXAM
[Normal Coordination] : normal coordination [Normal DTR UE/LE] : normal DTR UE/LE  [Normal Sensation] : normal sensation [Normal Mood and Affect] : normal mood and affect [Oriented] : oriented [Able to Communicate] : able to communicate [Normal Skin] : normal skin [No Rash] : no rash [No Ulcers] : no ulcers [No Lesions] : no lesions [No obvious lymphadenopathy in areas examined] : no obvious lymphadenopathy in areas examined [Well Developed] : well developed [Peripheral vascular exam is grossly normal] : peripheral vascular exam is grossly normal [No Respiratory Distress] : no respiratory distress [Flexion] : flexion [Extension] : extension [Rotation to left] : rotation to left [Right] : right shoulder [] : pain with internal rotation [de-identified] : Constitutional:\par  -  General Appearance: \par  Unremarkable\par  Body Habitus\par  Well Developed \par  Well Nourished\par  Body Habitus\par  No Deformities\par  Well Groomed\par  \par  Ability To communicate:\par  Normal\par  \par  Neurologic: \par  Global sensation is intact to upper and lower extremities.  See examination of Neck and/or Spine for exceptions.\par  Orientation to Time, Place and Person is: Normal\par  Mood And Affect is Normal\par  \par  Skin:\par  -  Head/Face, Right Upper/Lower Extremity, Left Upper/Lower Extremity: Normal\par  See Examination of Neck and/or Spine for exceptions\par  \par  Cardiovascular:\par  Peripheral Cardiovascular System is Normal\par  \par  Palpation of Lymph Nodes:\par  Normal Palpation of lymph nodes in: Axilla, Cervical, Inguinal\par  Abnormal Palpation of lymph nodes in: None

## 2024-05-29 NOTE — HISTORY OF PRESENT ILLNESS
[de-identified] : 05/29/2024 - Patient returns to the office to review MRI, cervical spine. She continues to complain of right, sided neck, pain into the shoulder, trapezius area, and radiating into the distal right upper extremity. No left arm symptoms. No significant improvement with anti-inflammatory medication's. her injection performed in the office at last office visit did not provide any significant long-term benefit or changing her symptoms.  05/15/2024 - The patient is here for an evaluation due to neck pain, proximal right arm pain, and radicular pain in the right arm. She notes that shoulder and arm pain worsen with the range of motion of their arm, particularly with internal rotation. Massage therapy offers temporary relief. Previously, they underwent trigger point injections with Dr. Chapman but discontinued as there was no improvement in pain. The patient is currently taking naproxen and does not report any left-sided arm symptoms. She states cervical surgery was helpful to improve pre op arm pain, but now there is a return of arm pain as of one year ago.   Date of Injury/Onset: 1 year ago  Pain:  At Rest: 10/10 With Activity:  /10 Mechanism of injury: NKI  Quality of symptoms: Sharp pains, numbness and tingling that goes down into RT arm, nauseous  Improves with: Naproxen, PM Tylenol   Worse with: Sleeping, Prior treatment: PCP Naproxen, Acupuncture  Prior Imaging: None  Additional Information: Pt drops items due to symptoms    08/01/2022 - 48 y/o female presents for a follow up evaluation of cervical. Patient c/o continued neck and right upper extremity pain. Pain predominantly in right trapezius, shoulder and periscapular area. No improvement with nsaid, muscle relaxer or trigger point injection. Patient reports no relief of symptoms from injection therapy  (at C5-6) with Dr. Fontaine. No relief from TPI. No positive response from injections.  Patient is RT handed. Finds her pain has affecting quality of life. Reports side effects from gabapentin, which she has stopped taking. Patient has had no success from tramadol or Lyrica. 
24

## 2024-05-29 NOTE — DISCUSSION/SUMMARY
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 Recommend you consider additional spinal steroid injection. Patient will make an appointment with interventional pain management and follow up afterwards to discuss results. We discussed possible surgical intervention, ACDF C6-7, if she is refractory to spinal steroid injection. Cumulative encounter duration exceeded 30 minutes.  Prior to appointment and during encounter with patient extensive medical records were reviewed including but not limited to, hospital records, outpatient records, imaging results, and lab data. During this appointment the patient was examined, diagnoses were discussed and explained in a face to face manner. In addition extensive time was spent reviewing aforementioned diagnostic studies. Counseling including abnormal image results, differential diagnoses, treatment options, risk and benefits, lifestyle changes, current condition, and current medications was performed. Patient's comments, questions, and concerns were addressed and patient verbalized understanding. Based on this patient's presentation at our office, which is an orthopedic spine surgeon's office, this patient inherently / intrinsically has a risk, however minute, of developing issues such as Cauda equina syndrome, bowel and bladder changes, or progression of motor or neurological deficits such as paralysis which may be permanent.

## 2024-06-17 ENCOUNTER — APPOINTMENT (OUTPATIENT)
Dept: PAIN MANAGEMENT | Facility: CLINIC | Age: 49
End: 2024-06-17

## 2024-06-19 NOTE — PHYSICAL EXAM
[Normal Coordination] : normal coordination [Normal Mood and Affect] : normal mood and affect [Oriented] : oriented [Able to Communicate] : able to communicate [Well Developed] : well developed [Well Nourished] : well nourished [5___] : right finger abductors 5[unfilled]/5 [4___] : right grasp 4[unfilled]/5 [] : negative Chase reflex

## 2024-06-19 NOTE — HISTORY OF PRESENT ILLNESS
[FreeTextEntry1] : 2024 - Patient presents for FUV regarding their neck pain, has not been seen in almost 2 years.   22 - Patient presents for a FUV following a right PM C7-T1 ALVARO on 22. Patient reports no short-term relief following the injection. She reports stiffness and pain to the right trap and paraesthesia to the right second, third and fourth digit. Denies side effects to the injection.   22 - The patient presents for initial evaluation regarding their neck pain. Patient was referred by Dr. Higgins. Patient is 6 months s/p C4-5 ACDF with Dr. Hgigins. She reports some improvement of pain following the sx. Patient reports this pain is different from the prior. Patient c/o neck pain radiating to the right shoulder with associated paraesthesia to the right digits. She reports pain has become more persistent. 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 Patient reports some benefit with Naproxen. Patient reports having IM cortisone injections with no observable benefit.   Injections:  1) C7-T1 ALVARO (22)   Pertinent Surgical History:  1) C4-5 ACDF (01/10/22) - Dr. Higgins  Imagin) MRI Cervical Spine (2024) - [facility] Rad C2-3 level shows no evidence of disc herniation or spinal stenosis. The neural foramina patent. C3-4 level shows broad central disc herniation impinging on the anterior aspect of cervical cord. Mild bilateral C3-4 foraminal spurring at C4-5 level shows a prior anterior fusion which appears solid. No extrinsic compression of thecal sac. The neural foramina patent. C5-6 level shows degenerative disc disease is mild central spinal stenosis. The neural foramina patent. C6-7, C7-T1 and T1-T2 levels show degenerative disc disease without focal disc herniation or central spinal stenosis. The neural foramina patent. Cervical cord and craniocervical junction are normal. Paravertebral soft tissue normal. No fracture or dislocation.  Physician Disclaimer: I have personally reviewed and confirmed all HPI data with the patient.

## 2024-06-19 NOTE — ASSESSMENT
[FreeTextEntry1] : A discussion regarding available pain management treatment options occurred with the patient.  These included interventional, rehabilitative, pharmacological, and alternative modalities. We will proceed with the following:   Interventional treatment options:  - Proceed with repeat right PM C7-T1 ALVARO with fluoroscopic guidance (80mg Kenalog)  - counseled if no further benefit with repeat ALVARO will not recommend further - can consider cervical TPI for ongoing myofascial pain  - see additional instructions below   Rehabilitative options:  - completed prior PT  - continue HEP as tolerated   Medication based treatment options:  - patient self d/c Lyrica; defers further consideration  - continue naproxen 500 mg BID PRN  - continue OTC topical analgesics PRN  - see additional instructions below   Complementary treatment options:  - Weight management and lifestyle modifications discussed   Additional treatment recommendations as follows:  - patient with follow up with Dr. Higgins as directed  - Follow up 1-2 weeks post injection for assessment of efficacy and further recommendations.  We have discussed the risks, benefits, and alternatives NSAID therapy including but not limited to the risk of bleeding, thrombosis, gastric mucosal irritation/ulceration, allergic reaction and kidney dysfunction; the patient verbalizes an understanding.  The risks, benefits and alternatives of the proposed procedure were explained in detail with the patient. The risks outlined include but are not limited to infection, bleeding, post- dural puncture headache, nerve injury, a temporary increase in pain, failure to resolve symptoms, allergic reaction, and possible elevation of blood sugar in diabetics if using corticosteroid.  All questions were answered to patient's apparent satisfaction and he/she verbalized an understanding.  The documentation recorded by the scribe, in my presence, accurately reflects the service I personally performed and the decisions made by me with my edits as appropriate.  I, Anson Easton acting as scribe, attest that this documentation has been prepared under the direction and in the presence of Provider Srinivas Fontaine DO.

## 2024-07-12 ENCOUNTER — APPOINTMENT (OUTPATIENT)
Dept: ORTHOPEDIC SURGERY | Facility: CLINIC | Age: 49
End: 2024-07-12

## 2024-07-12 VITALS — BODY MASS INDEX: 33.43 KG/M2 | HEIGHT: 67 IN | WEIGHT: 213 LBS

## 2024-07-12 DIAGNOSIS — M46.00 SPINAL ENTHESOPATHY, SITE UNSPECIFIED: ICD-10-CM

## 2024-07-12 DIAGNOSIS — M50.10 CERVICAL DISC DISORDER WITH RADICULOPATHY, UNSPECIFIED CERVICAL REGION: ICD-10-CM

## 2024-07-12 DIAGNOSIS — M62.830 MUSCLE SPASM OF BACK: ICD-10-CM

## 2024-07-12 PROCEDURE — ZZZZZ: CPT

## 2024-07-13 PROBLEM — M62.830 SPASM OF RIGHT TRAPEZIUS MUSCLE: Status: ACTIVE | Noted: 2024-07-13

## 2024-10-24 ENCOUNTER — APPOINTMENT (OUTPATIENT)
Dept: ORTHOPEDIC SURGERY | Facility: HOSPITAL | Age: 49
End: 2024-10-24

## 2024-10-24 PROCEDURE — 22845 INSERT SPINE FIXATION DEVICE: CPT | Mod: 80

## 2024-10-24 PROCEDURE — 22853 INSJ BIOMECHANICAL DEVICE: CPT

## 2024-10-24 PROCEDURE — 22551 ARTHRD ANT NTRBDY CERVICAL: CPT | Mod: 62

## 2024-10-24 PROCEDURE — 22845 INSERT SPINE FIXATION DEVICE: CPT

## 2024-10-24 PROCEDURE — 22853 INSJ BIOMECHANICAL DEVICE: CPT | Mod: 80

## 2024-10-24 PROCEDURE — 22552 ARTHRD ANT NTRBD CERVICAL EA: CPT | Mod: 62

## 2024-11-01 DIAGNOSIS — M50.10 CERVICAL DISC DISORDER WITH RADICULOPATHY, UNSPECIFIED CERVICAL REGION: ICD-10-CM

## 2024-11-01 RX ORDER — METHYLPREDNISOLONE 4 MG/1
4 TABLET ORAL
Qty: 1 | Refills: 0 | Status: ACTIVE | COMMUNITY
Start: 2024-11-01 | End: 1900-01-01

## 2024-11-06 ENCOUNTER — APPOINTMENT (OUTPATIENT)
Dept: ORTHOPEDIC SURGERY | Facility: CLINIC | Age: 49
End: 2024-11-06

## 2024-11-06 VITALS — WEIGHT: 213 LBS | BODY MASS INDEX: 33.43 KG/M2 | HEIGHT: 67 IN

## 2024-11-06 DIAGNOSIS — M79.10 MYALGIA, UNSPECIFIED SITE: ICD-10-CM

## 2024-11-06 DIAGNOSIS — M54.12 RADICULOPATHY, CERVICAL REGION: ICD-10-CM

## 2024-11-06 PROCEDURE — 99024 POSTOP FOLLOW-UP VISIT: CPT

## 2024-11-06 PROCEDURE — 72040 X-RAY EXAM NECK SPINE 2-3 VW: CPT

## 2024-11-06 PROCEDURE — 20552 NJX 1/MLT TRIGGER POINT 1/2: CPT | Mod: 58

## 2024-11-06 RX ORDER — DIAZEPAM 5 MG/1
5 TABLET ORAL EVERY 8 HOURS
Qty: 60 | Refills: 0 | Status: ACTIVE | COMMUNITY
Start: 2024-11-06 | End: 1900-01-01

## 2024-11-09 PROBLEM — M79.10 MYALGIA, UNSPECIFIED SITE: Status: ACTIVE | Noted: 2024-11-09

## 2024-11-25 ENCOUNTER — APPOINTMENT (OUTPATIENT)
Dept: ORTHOPEDIC SURGERY | Facility: CLINIC | Age: 49
End: 2024-11-25
Payer: COMMERCIAL

## 2024-11-25 VITALS — HEIGHT: 67 IN | WEIGHT: 213 LBS | BODY MASS INDEX: 33.43 KG/M2

## 2024-11-25 DIAGNOSIS — M54.12 RADICULOPATHY, CERVICAL REGION: ICD-10-CM

## 2024-11-25 PROCEDURE — 99024 POSTOP FOLLOW-UP VISIT: CPT

## 2024-11-25 RX ORDER — TRAMADOL HYDROCHLORIDE 50 MG/1
50 TABLET, COATED ORAL TWICE DAILY
Qty: 60 | Refills: 0 | Status: ACTIVE | COMMUNITY
Start: 2024-11-25 | End: 1900-01-01

## 2024-11-25 RX ORDER — DIAZEPAM 5 MG/1
5 TABLET ORAL EVERY 8 HOURS
Qty: 90 | Refills: 0 | Status: ACTIVE | COMMUNITY
Start: 2024-11-25 | End: 1900-01-01

## 2024-12-16 ENCOUNTER — APPOINTMENT (OUTPATIENT)
Dept: ORTHOPEDIC SURGERY | Facility: CLINIC | Age: 49
End: 2024-12-16

## 2025-02-10 ENCOUNTER — APPOINTMENT (OUTPATIENT)
Dept: ORTHOPEDIC SURGERY | Facility: CLINIC | Age: 50
End: 2025-02-10
Payer: COMMERCIAL

## 2025-02-10 VITALS — BODY MASS INDEX: 33.43 KG/M2 | WEIGHT: 213 LBS | HEIGHT: 67 IN

## 2025-02-10 DIAGNOSIS — M54.12 RADICULOPATHY, CERVICAL REGION: ICD-10-CM

## 2025-02-10 PROCEDURE — 99213 OFFICE O/P EST LOW 20 MIN: CPT

## 2025-02-10 PROCEDURE — 72040 X-RAY EXAM NECK SPINE 2-3 VW: CPT

## 2025-03-24 ENCOUNTER — APPOINTMENT (OUTPATIENT)
Dept: ORTHOPEDIC SURGERY | Facility: CLINIC | Age: 50
End: 2025-03-24